# Patient Record
Sex: FEMALE | Race: WHITE | NOT HISPANIC OR LATINO | Employment: OTHER | URBAN - METROPOLITAN AREA
[De-identification: names, ages, dates, MRNs, and addresses within clinical notes are randomized per-mention and may not be internally consistent; named-entity substitution may affect disease eponyms.]

---

## 2018-04-20 ENCOUNTER — HOSPITAL ENCOUNTER (OUTPATIENT)
Dept: FAMILY MEDICINE CLINIC | Facility: CLINIC | Age: 48
Setting detail: SPECIMEN
Discharge: HOME | End: 2018-04-20
Attending: FAMILY MEDICINE | Admitting: FAMILY MEDICINE

## 2018-04-20 LAB
ALBUMIN SERPL-MCNC: 4.7 G/DL (ref 3.5–4.8)
ALBUMIN/GLOB SERPL: 2 {RATIO} (ref 1–1.7)
ALP SERPL-CCNC: 39 IU/L (ref 32–91)
ALT SERPL-CCNC: 13 IU/L (ref 14–54)
ANION GAP SERPL CALC-SCNC: 10.3 MMOL/L (ref 10–20)
AST SERPL-CCNC: 18 IU/L (ref 15–41)
BASOPHILS # BLD AUTO: 0.1 10*3/UL (ref 0–0.2)
BASOPHILS NFR BLD AUTO: 1 % (ref 0–2)
BILIRUB SERPL-MCNC: 1.5 MG/DL (ref 0.3–1.2)
BUN SERPL-MCNC: 8 MG/DL (ref 8–20)
BUN/CREAT SERPL: 11.4 (ref 5.4–26.2)
CALCIUM SERPL-MCNC: 9.5 MG/DL (ref 8.9–10.3)
CHLORIDE SERPL-SCNC: 105 MMOL/L (ref 101–111)
CHOLEST SERPL-MCNC: 229 MG/DL
CHOLEST/HDLC SERPL: 2.4 {RATIO}
CONV CO2: 25 MMOL/L (ref 22–32)
CONV LDL CHOLESTEROL DIRECT: 114 MG/DL (ref 0–100)
CONV TOTAL PROTEIN: 7 G/DL (ref 6.1–7.9)
CREAT UR-MCNC: 0.7 MG/DL (ref 0.4–1)
DIFFERENTIAL METHOD BLD: (no result)
EOSINOPHIL # BLD AUTO: 0.1 10*3/UL (ref 0–0.3)
EOSINOPHIL # BLD AUTO: 1 % (ref 0–3)
ERYTHROCYTE [DISTWIDTH] IN BLOOD BY AUTOMATED COUNT: 13.3 % (ref 11.5–14.5)
GLOBULIN UR ELPH-MCNC: 2.3 G/DL (ref 2.5–3.8)
GLUCOSE SERPL-MCNC: 85 MG/DL (ref 65–99)
HCT VFR BLD AUTO: 42.4 % (ref 35–49)
HDLC SERPL-MCNC: 96 MG/DL
HGB BLD-MCNC: 14.4 G/DL (ref 12–15)
LDLC/HDLC SERPL: 1.2 {RATIO}
LIPID INTERPRETATION: ABNORMAL
LYMPHOCYTES # BLD AUTO: 2.6 10*3/UL (ref 0.8–4.8)
LYMPHOCYTES NFR BLD AUTO: 31 % (ref 18–42)
MCH RBC QN AUTO: 30.7 PG (ref 26–32)
MCHC RBC AUTO-ENTMCNC: 34 G/DL (ref 32–36)
MCV RBC AUTO: 90.1 FL (ref 80–94)
MONOCYTES # BLD AUTO: 0.5 10*3/UL (ref 0.1–1.3)
MONOCYTES NFR BLD AUTO: 6 % (ref 2–11)
NEUTROPHILS # BLD AUTO: 5.2 10*3/UL (ref 2.3–8.6)
NEUTROPHILS NFR BLD AUTO: 61 % (ref 50–75)
NRBC BLD AUTO-RTO: 0 /100{WBCS}
NRBC/RBC NFR BLD MANUAL: 0 10*3/UL
PLATELET # BLD AUTO: 269 10*3/UL (ref 150–450)
PMV BLD AUTO: 9.2 FL (ref 7.4–10.4)
POTASSIUM SERPL-SCNC: 3.3 MMOL/L (ref 3.6–5.1)
RBC # BLD AUTO: 4.7 10*6/UL (ref 4–5.4)
SODIUM SERPL-SCNC: 137 MMOL/L (ref 136–144)
TRIGL SERPL-MCNC: 69 MG/DL
VLDLC SERPL CALC-MCNC: 19.2 MG/DL
WBC # BLD AUTO: 8.4 10*3/UL (ref 4.5–11.5)

## 2018-11-16 ENCOUNTER — HOSPITAL ENCOUNTER (OUTPATIENT)
Dept: FAMILY MEDICINE CLINIC | Facility: CLINIC | Age: 48
Setting detail: SPECIMEN
Discharge: HOME | End: 2018-11-16
Attending: FAMILY MEDICINE | Admitting: FAMILY MEDICINE

## 2018-11-16 LAB
ALBUMIN SERPL-MCNC: 4.3 G/DL (ref 3.5–4.8)
ALBUMIN/GLOB SERPL: 1.8 {RATIO} (ref 1–1.7)
ALP SERPL-CCNC: 45 IU/L (ref 32–91)
ALT SERPL-CCNC: 14 IU/L (ref 14–54)
AMYLASE SERPL-CCNC: 39 U/L (ref 36–128)
ANION GAP SERPL CALC-SCNC: 15.1 MMOL/L (ref 10–20)
AST SERPL-CCNC: 16 IU/L (ref 15–41)
BASOPHILS # BLD AUTO: 0.1 10*3/UL (ref 0–0.2)
BASOPHILS NFR BLD AUTO: 1 % (ref 0–2)
BILIRUB SERPL-MCNC: 1.6 MG/DL (ref 0.3–1.2)
BUN SERPL-MCNC: 9 MG/DL (ref 8–20)
BUN/CREAT SERPL: 11.3 (ref 5.4–26.2)
CALCIUM SERPL-MCNC: 10.2 MG/DL (ref 8.9–10.3)
CHLORIDE SERPL-SCNC: 102 MMOL/L (ref 101–111)
CONV CO2: 26 MMOL/L (ref 22–32)
CONV TOTAL PROTEIN: 6.7 G/DL (ref 6.1–7.9)
CREAT UR-MCNC: 0.8 MG/DL (ref 0.4–1)
DIFFERENTIAL METHOD BLD: (no result)
EOSINOPHIL # BLD AUTO: 0.1 10*3/UL (ref 0–0.3)
EOSINOPHIL # BLD AUTO: 1 % (ref 0–3)
ERYTHROCYTE [DISTWIDTH] IN BLOOD BY AUTOMATED COUNT: 13.5 % (ref 11.5–14.5)
GLOBULIN UR ELPH-MCNC: 2.4 G/DL (ref 2.5–3.8)
GLUCOSE SERPL-MCNC: 101 MG/DL (ref 65–99)
HCT VFR BLD AUTO: 42.1 % (ref 35–49)
HGB BLD-MCNC: 14.4 G/DL (ref 12–15)
LIPASE SERPL-CCNC: 26 U/L (ref 22–51)
LYMPHOCYTES # BLD AUTO: 2.7 10*3/UL (ref 0.8–4.8)
LYMPHOCYTES NFR BLD AUTO: 30 % (ref 18–42)
MCH RBC QN AUTO: 31.1 PG (ref 26–32)
MCHC RBC AUTO-ENTMCNC: 34.2 G/DL (ref 32–36)
MCV RBC AUTO: 91.1 FL (ref 80–94)
MONOCYTES # BLD AUTO: 0.6 10*3/UL (ref 0.1–1.3)
MONOCYTES NFR BLD AUTO: 7 % (ref 2–11)
NEUTROPHILS # BLD AUTO: 5.5 10*3/UL (ref 2.3–8.6)
NEUTROPHILS NFR BLD AUTO: 61 % (ref 50–75)
NRBC BLD AUTO-RTO: 0 /100{WBCS}
NRBC/RBC NFR BLD MANUAL: 0 10*3/UL
PLATELET # BLD AUTO: 286 10*3/UL (ref 150–450)
PMV BLD AUTO: 8.9 FL (ref 7.4–10.4)
POTASSIUM SERPL-SCNC: 4.1 MMOL/L (ref 3.6–5.1)
RBC # BLD AUTO: 4.62 10*6/UL (ref 4–5.4)
SODIUM SERPL-SCNC: 139 MMOL/L (ref 136–144)
WBC # BLD AUTO: 9 10*3/UL (ref 4.5–11.5)

## 2018-11-28 ENCOUNTER — HOSPITAL ENCOUNTER (OUTPATIENT)
Dept: ULTRASOUND IMAGING | Facility: HOSPITAL | Age: 48
Discharge: HOME | End: 2018-11-28
Attending: FAMILY MEDICINE | Admitting: FAMILY MEDICINE

## 2018-12-06 ENCOUNTER — HOSPITAL ENCOUNTER (OUTPATIENT)
Dept: FAMILY MEDICINE CLINIC | Facility: CLINIC | Age: 48
Setting detail: SPECIMEN
Discharge: HOME | End: 2018-12-06
Attending: FAMILY MEDICINE | Admitting: FAMILY MEDICINE

## 2018-12-07 LAB — HAV IGM SERPL QL IA: NONREACTIVE

## 2018-12-21 ENCOUNTER — OFFICE (AMBULATORY)
Dept: URBAN - METROPOLITAN AREA CLINIC 64 | Facility: CLINIC | Age: 48
End: 2018-12-21

## 2018-12-21 VITALS
HEART RATE: 92 BPM | DIASTOLIC BLOOD PRESSURE: 88 MMHG | WEIGHT: 149 LBS | HEIGHT: 68 IN | SYSTOLIC BLOOD PRESSURE: 145 MMHG

## 2018-12-21 DIAGNOSIS — Z12.11 ENCOUNTER FOR SCREENING FOR MALIGNANT NEOPLASM OF COLON: ICD-10-CM

## 2018-12-21 DIAGNOSIS — R10.11 RIGHT UPPER QUADRANT PAIN: ICD-10-CM

## 2018-12-21 DIAGNOSIS — R16.0 HEPATOMEGALY, NOT ELSEWHERE CLASSIFIED: ICD-10-CM

## 2018-12-21 PROCEDURE — 99244 OFF/OP CNSLTJ NEW/EST MOD 40: CPT | Performed by: INTERNAL MEDICINE

## 2018-12-29 ENCOUNTER — HOSPITAL ENCOUNTER (OUTPATIENT)
Dept: MRI IMAGING | Facility: HOSPITAL | Age: 48
Discharge: HOME | End: 2018-12-29
Attending: INTERNAL MEDICINE | Admitting: INTERNAL MEDICINE

## 2019-01-31 ENCOUNTER — OFFICE (AMBULATORY)
Dept: URBAN - METROPOLITAN AREA PATHOLOGY 4 | Facility: PATHOLOGY | Age: 49
End: 2019-01-31

## 2019-01-31 ENCOUNTER — ON CAMPUS - OUTPATIENT (AMBULATORY)
Dept: URBAN - METROPOLITAN AREA HOSPITAL 2 | Facility: HOSPITAL | Age: 49
End: 2019-01-31

## 2019-01-31 VITALS
OXYGEN SATURATION: 98 % | DIASTOLIC BLOOD PRESSURE: 72 MMHG | HEART RATE: 86 BPM | HEART RATE: 99 BPM | WEIGHT: 141.8 LBS | SYSTOLIC BLOOD PRESSURE: 120 MMHG | HEART RATE: 84 BPM | RESPIRATION RATE: 16 BRPM | HEIGHT: 68 IN | DIASTOLIC BLOOD PRESSURE: 78 MMHG | HEART RATE: 95 BPM | SYSTOLIC BLOOD PRESSURE: 109 MMHG | SYSTOLIC BLOOD PRESSURE: 129 MMHG | RESPIRATION RATE: 13 BRPM | OXYGEN SATURATION: 100 % | SYSTOLIC BLOOD PRESSURE: 134 MMHG | SYSTOLIC BLOOD PRESSURE: 139 MMHG | HEART RATE: 87 BPM | TEMPERATURE: 98.5 F | DIASTOLIC BLOOD PRESSURE: 87 MMHG | DIASTOLIC BLOOD PRESSURE: 94 MMHG | OXYGEN SATURATION: 97 %

## 2019-01-31 DIAGNOSIS — K31.7 POLYP OF STOMACH AND DUODENUM: ICD-10-CM

## 2019-01-31 DIAGNOSIS — K44.9 DIAPHRAGMATIC HERNIA WITHOUT OBSTRUCTION OR GANGRENE: ICD-10-CM

## 2019-01-31 DIAGNOSIS — R10.11 RIGHT UPPER QUADRANT PAIN: ICD-10-CM

## 2019-01-31 LAB
GI HISTOLOGY: A. UNSPECIFIED: (no result)
GI HISTOLOGY: PDF REPORT: (no result)

## 2019-01-31 PROCEDURE — 88305 TISSUE EXAM BY PATHOLOGIST: CPT | Performed by: INTERNAL MEDICINE

## 2019-01-31 PROCEDURE — 43239 EGD BIOPSY SINGLE/MULTIPLE: CPT | Performed by: INTERNAL MEDICINE

## 2022-03-10 PROCEDURE — 87186 SC STD MICRODIL/AGAR DIL: CPT | Performed by: FAMILY MEDICINE

## 2022-03-10 PROCEDURE — 87077 CULTURE AEROBIC IDENTIFY: CPT | Performed by: FAMILY MEDICINE

## 2022-03-10 PROCEDURE — 87086 URINE CULTURE/COLONY COUNT: CPT | Performed by: FAMILY MEDICINE

## 2024-03-11 ENCOUNTER — OFFICE VISIT (OUTPATIENT)
Dept: FAMILY MEDICINE CLINIC | Facility: CLINIC | Age: 54
End: 2024-03-11
Payer: COMMERCIAL

## 2024-03-11 VITALS
HEIGHT: 66 IN | OXYGEN SATURATION: 99 % | RESPIRATION RATE: 18 BRPM | TEMPERATURE: 98.7 F | HEART RATE: 81 BPM | BODY MASS INDEX: 24.85 KG/M2 | WEIGHT: 154.6 LBS | SYSTOLIC BLOOD PRESSURE: 127 MMHG | DIASTOLIC BLOOD PRESSURE: 87 MMHG

## 2024-03-11 DIAGNOSIS — R05.1 ACUTE COUGH: ICD-10-CM

## 2024-03-11 DIAGNOSIS — S46.812A STRAIN OF LEFT TRAPEZIUS MUSCLE, INITIAL ENCOUNTER: ICD-10-CM

## 2024-03-11 DIAGNOSIS — J01.20 ACUTE NON-RECURRENT ETHMOIDAL SINUSITIS: Primary | ICD-10-CM

## 2024-03-11 RX ORDER — BENZONATATE 100 MG/1
100 CAPSULE ORAL 3 TIMES DAILY PRN
Qty: 30 CAPSULE | Refills: 0 | Status: SHIPPED | OUTPATIENT
Start: 2024-03-11

## 2024-03-11 RX ORDER — PREDNISONE 20 MG/1
20 TABLET ORAL 2 TIMES DAILY
Qty: 10 TABLET | Refills: 0 | Status: SHIPPED | OUTPATIENT
Start: 2024-03-11 | End: 2024-03-16

## 2024-03-11 RX ORDER — AZITHROMYCIN 250 MG/1
TABLET, FILM COATED ORAL
Qty: 6 TABLET | Refills: 0 | Status: SHIPPED | OUTPATIENT
Start: 2024-03-11

## 2024-03-11 NOTE — PROGRESS NOTES
Subjective   Sharon Ruth is a 53 y.o. female.     History of Present Illness  Pt presents with complaint of nasal congestion. Symptoms started about 4 weeks ago. She was seen in the urgent care on 3/1/24 and prescribed Doxycycline and a Medrol Dose Fortunato but she continues to have symptoms. Symptoms seemed to feel better then next 2 days but then symptoms worsened.  She did finish them completely.  She continues to have a lot of sinus pressure and nasal congestion.  She has been doing over the counter medications, nasal sprays. Denies any shortness of breath or fevers.  She also pulled a muscle in left shoulder and it has been aggravated by her cough.    She hasn't been to this office for over a year due to feeling healthy.  She isn't up to date on mammogram, labs, pap, colon cancer screening and doesn't want to at this time due to being healthy.         No past medical history on file.  No past surgical history on file.  No family history on file.  Social History     Socioeconomic History    Marital status:    Tobacco Use    Smoking status: Never     Passive exposure: Never    Smokeless tobacco: Never   Vaping Use    Vaping status: Never Used   Substance and Sexual Activity    Alcohol use: Yes     Alcohol/week: 1.0 standard drink of alcohol     Types: 1 Glasses of wine per week    Drug use: Never         Current Outpatient Medications:     azithromycin (Zithromax Z-Fortunato) 250 MG tablet, Take 2 tablets by mouth on day 1, then 1 tablet daily on days 2-5, Disp: 6 tablet, Rfl: 0    benzonatate (Tessalon Perles) 100 MG capsule, Take 1 capsule by mouth 3 (Three) Times a Day As Needed for Cough., Disp: 30 capsule, Rfl: 0    predniSONE (DELTASONE) 20 MG tablet, Take 1 tablet by mouth 2 (Two) Times a Day for 5 days., Disp: 10 tablet, Rfl: 0    Review of Systems   Constitutional:  Positive for fatigue. Negative for activity change, appetite change, chills, diaphoresis, fever, unexpected weight gain and unexpected  "weight loss.   HENT:  Positive for congestion and sinus pressure. Negative for trouble swallowing.    Eyes:  Negative for blurred vision and visual disturbance.   Respiratory:  Positive for cough. Negative for chest tightness, shortness of breath and wheezing.    Cardiovascular:  Negative for chest pain, palpitations and leg swelling.   Gastrointestinal:  Negative for abdominal pain, diarrhea, nausea and vomiting.   Musculoskeletal:  Negative for gait problem.   Neurological:  Negative for dizziness, tremors, syncope, weakness, light-headedness, headache and confusion.   Psychiatric/Behavioral:  Negative for sleep disturbance, depressed mood and stress. The patient is not nervous/anxious.      /87 (BP Location: Left arm, Patient Position: Sitting, Cuff Size: Adult)   Pulse 81   Temp 98.7 °F (37.1 °C) (Temporal)   Resp 18   Ht 168.9 cm (66.5\")   Wt 70.1 kg (154 lb 9.6 oz)   SpO2 99%   BMI 24.58 kg/m²       Objective   Physical Exam  Vitals and nursing note reviewed.   Constitutional:       Appearance: Normal appearance. She is normal weight.   HENT:      Head: Normocephalic and atraumatic.      Right Ear: Tympanic membrane, ear canal and external ear normal.      Left Ear: Tympanic membrane, ear canal and external ear normal.      Nose: Nose normal.      Right Sinus: Maxillary sinus tenderness present.      Left Sinus: Maxillary sinus tenderness present.      Mouth/Throat:      Mouth: Mucous membranes are moist.      Pharynx: Oropharynx is clear.   Eyes:      Pupils: Pupils are equal, round, and reactive to light.   Cardiovascular:      Rate and Rhythm: Normal rate and regular rhythm.      Heart sounds: Normal heart sounds.   Pulmonary:      Effort: Pulmonary effort is normal.      Breath sounds: Normal breath sounds.   Musculoskeletal:         General: Tenderness present. Normal range of motion.      Left shoulder: Tenderness present.        Arms:       Cervical back: Normal range of motion and neck " supple. No rigidity.   Lymphadenopathy:      Cervical: No cervical adenopathy.   Skin:     General: Skin is warm and dry.   Neurological:      General: No focal deficit present.      Mental Status: She is alert and oriented to person, place, and time.   Psychiatric:         Mood and Affect: Mood normal.         Behavior: Behavior normal.         Thought Content: Thought content normal.         Procedures     Assessment    Diagnoses and all orders for this visit:    1. Acute non-recurrent ethmoidal sinusitis (Primary)  Comments:  Will treat with azithromycin and prednisone.  Let me know if not improving.  This is second round of antibiotics so if symptoms continue, will get CT sinus.  Orders:  -     azithromycin (Zithromax Z-Fortunato) 250 MG tablet; Take 2 tablets by mouth on day 1, then 1 tablet daily on days 2-5  Dispense: 6 tablet; Refill: 0  -     predniSONE (DELTASONE) 20 MG tablet; Take 1 tablet by mouth 2 (Two) Times a Day for 5 days.  Dispense: 10 tablet; Refill: 0    2. Strain of left trapezius muscle, initial encounter  Comments:  Work on stretches and see PT.  Orders:  -     Ambulatory Referral to Physical Therapy Evaluate and treat    3. Acute cough  Comments:  Slight cough from drainage.  Try benzonatate as needed. Let me know if not resolving.  Orders:  -     benzonatate (Tessalon Perles) 100 MG capsule; Take 1 capsule by mouth 3 (Three) Times a Day As Needed for Cough.  Dispense: 30 capsule; Refill: 0         Make appointment for routine physical and needs to get up to date on preventative care.

## 2024-03-11 NOTE — PATIENT INSTRUCTIONS
Please make follow up appointment for routine physical to get up to date on mammogram, labs and make sure screenings are up to date.

## 2024-03-18 ENCOUNTER — TREATMENT (OUTPATIENT)
Dept: PHYSICAL THERAPY | Facility: CLINIC | Age: 54
End: 2024-03-18
Payer: COMMERCIAL

## 2024-03-18 DIAGNOSIS — S46.812A STRAIN OF LEFT TRAPEZIUS MUSCLE, INITIAL ENCOUNTER: Primary | ICD-10-CM

## 2024-03-18 NOTE — PROGRESS NOTES
Physical Therapy Initial Evaluation and Plan of Care      Patient: Sharon Ruth   : 1970  Diagnosis/ICD-10 Code:  Strain of left trapezius muscle, initial encounter [S46.812A]  Referring practitioner: PRITESH Carter  Date of Initial Visit: 3/18/2024  Today's Date: 3/18/2024  Patient seen for 1 sessions           Subjective Questionnaire: QuickDASH: 41%      Subjective Evaluation    History of Present Illness  Mechanism of injury: Patient presents to physical therapy with cc of pain in left scapula that has been present for the past 5-6 weeks.  Reports that she was scooting herself in bed when she first noticed pain, however after a couple weeks it got better.  However over the past few weeks it has gotten worse again.  Reports that pain is fairly constant and limits her ability to get comfortable to sleep.  Reports mild pain with everyday activities.  Wishes to have less left shoulder/scapular pain with ADL's and work activities.     Pain  Current pain ratin  At worst pain ratin  Location: left scapula  Quality: dull ache, sharp and knife-like  Relieving factors: ice  Aggravating factors: overhead activity, lifting, sleeping, prolonged positioning and outstretched reach  Progression: no change    Hand dominance: right    Treatments  Previous treatment: chiropractic  Patient Goals  Patient goals for therapy: decreased pain, increased strength and increased motion           Objective          Palpation   Left   Hypertonic in the middle trapezius, rhomboids, teres minor and upper trapezius.   Tenderness of the middle trapezius, rhomboids, teres minor and upper trapezius.     Tenderness     Additional Tenderness Details  TTP with reproduction of left scapular pain with PA glide to T2-T4    Active Range of Motion   Cervical/Thoracic Spine   Cervical    Left lateral flexion: 20 degrees   Right lateral flexion: 25 degrees   Left rotation: 51 degrees   Right rotation: 68 degrees     Strength/Myotome  Testing   Cervical Spine     Right   Normal strength    Left Shoulder     Planes of Motion   Flexion: 4   Abduction: 4   External rotation at 0°: 4+   Internal rotation at 0°: 4+     Isolated Muscles   Biceps: 5   Lower trapezius: 4   Middle trapezius: 4   Rhomboids: 4   Triceps: 5     Tests     Additional Tests Details  Quadrant test (+) left           Assessment & Plan       Assessment  Impairments: abnormal or restricted ROM, impaired physical strength, lacks appropriate home exercise program and pain with function   Functional limitations: carrying objects, lifting, pulling, pushing, uncomfortable because of pain, moving in bed and reaching overhead   Assessment details: Patient presents to physical therapy with s/s of thoracic hypomobility with left scapular pain.  Patient demonstrates hypomobility in upper thoracic spine, hypomobility in cervical spine, as weakness/pain with resisted testing of LUE.  Patient is appropriate for PT intervention in order to address these deficits so that she may tolerate work and ADL activities with less pain/limitation.  Prognosis: good    Goals  Plan Goals: In two weeks, patient will report at least 25% reduction in pain level in left UE and thoracic spine.   In two weeks, patient will demonstrate compliance with HEP.     In four weeks, patient will demonstrate at least 4+/5 muscular strength in LUE with resisted testing in order to demonstrate improved ability to lift and carry objects weighing at least 10 lbs with work activities.   In four weeks, patient will demonstrate equal bilateral cervical sidebending and rotation in order to demonstrate decreased limitation with looking over her shoulder when driving.   In four weeks, patient will demonstrate decreased perceived disability by decreasing score on QuickDASH by at least 15%.     Plan  Therapy options: will be seen for skilled therapy services  Planned modality interventions: cryotherapy, dry needling, TENS, thermotherapy  (hydrocollator packs) and traction  Planned therapy interventions: manual therapy, neuromuscular re-education, soft tissue mobilization, spinal/joint mobilization, strengthening, stretching, therapeutic activities, joint mobilization, home exercise program and functional ROM exercises  Frequency: 2x week  Duration in weeks: 6  Treatment plan discussed with: patient        Manual Therapy:    10     mins  46692;  Therapeutic Exercise:    15     mins  91506;     Neuromuscular Laureen:        mins  89018;    Therapeutic Activity:          mins  90613;     Gait Training:           mins  71114;     Ultrasound:          mins  35748;    Electrical Stimulation:         mins  28603 ( );  Dry Needling          mins self-pay    Timed Treatment:   25   mins   Total Treatment:     55   mins    PT SIGNATURE: Abdiaziz Huang, NICHELLE   DATE TREATMENT INITIATED: 3/18/2024    Initial Certification  Certification Period: 6/16/2024  I certify that the therapy services are furnished while this patient is under my care.  The services outlined above are required by this patient, and will be reviewed every 90 days.     PHYSICIAN: Dayanna Morrow PA      DATE:     Please sign and return via fax to [phone].. Thank you, [facility].

## 2024-03-19 ENCOUNTER — TELEPHONE (OUTPATIENT)
Dept: FAMILY MEDICINE CLINIC | Facility: CLINIC | Age: 54
End: 2024-03-19
Payer: COMMERCIAL

## 2024-03-19 DIAGNOSIS — S46.812A STRAIN OF LEFT TRAPEZIUS MUSCLE, INITIAL ENCOUNTER: Primary | ICD-10-CM

## 2024-03-19 RX ORDER — TIZANIDINE 4 MG/1
4 TABLET ORAL EVERY 8 HOURS PRN
Qty: 30 TABLET | Refills: 0 | Status: SHIPPED | OUTPATIENT
Start: 2024-03-19

## 2024-03-19 NOTE — TELEPHONE ENCOUNTER
PATIENT CALLED BACK AND STATES SHE RECEIVED A CALL STATING SHE NEEDED TO BE SEEN FOR A PRESCRIPTION FOR MUSCLE RELAXER'S FOR SHOULDER PAIN. SHE WAS SEEN BY KYLE MEDRANO ON 3/11/24 AND WAS GIVEN THE REFERRAL TO PHYSICAL THERAPY. THE PHYSICAL THERAPIST SUGGEST A MUSCLE RELAXER TO HELP WITH PROGRESS.     SHE IS WANTING TO SEE IF THE MUSCLE RELAXER CAN BE CALLED IN.     [id number] - [place], [place] - [address] - [phone]  - [phone]  [phone]     CALL BACK NUMBER [phone]

## 2024-03-20 ENCOUNTER — TREATMENT (OUTPATIENT)
Dept: PHYSICAL THERAPY | Facility: CLINIC | Age: 54
End: 2024-03-20
Payer: COMMERCIAL

## 2024-03-20 DIAGNOSIS — S46.812A STRAIN OF LEFT TRAPEZIUS MUSCLE, INITIAL ENCOUNTER: Primary | ICD-10-CM

## 2024-03-20 NOTE — PROGRESS NOTES
Physical Therapy Daily Progress Note    Patient: Sharon Ruth   : 1970  Diagnosis/ICD-10 Code:  Strain of left trapezius muscle, initial encounter [S46.812A]  Referring practitioner: PRITESH Carter  Date of Initial Visit: Type: THERAPY  Noted: 3/18/2024  Today's Date: 3/20/2024  Patient seen for 2 sessions         Sharon Ruth reports: Soreness in cervical/thoracic ceasar, as well as pain in left shoulder continues to be present.  Reports compliance with HEP.    Objective   See Exercise, Manual, and Modality Logs for complete treatment.       Assessment/Plan    Reported pain with right sidebending/left rotation MET to cervical spine.  Patient also reports soreness with STM to dorsal scapular musculature on left side.  Feeling better after modalities at end of visit.    Progress per Plan of Care           Manual Therapy:    25     mins  90913;  Therapeutic Exercise:    13     mins  91470;     Neuromuscular Laureen:        mins  96327;    Therapeutic Activity:          mins  10642;     Gait Training:           mins  61001;     Ultrasound:          mins  05156;    Electrical Stimulation:    15     mins  41210 ( );  Dry Needling          mins self-pay    Timed Treatment:   38   mins   Total Treatment:     53   mins    Abdiaziz Huang PT  Physical Therapist

## 2024-03-25 ENCOUNTER — TREATMENT (OUTPATIENT)
Dept: PHYSICAL THERAPY | Facility: CLINIC | Age: 54
End: 2024-03-25
Payer: COMMERCIAL

## 2024-03-25 DIAGNOSIS — S46.812A STRAIN OF LEFT TRAPEZIUS MUSCLE, INITIAL ENCOUNTER: Primary | ICD-10-CM

## 2024-03-25 NOTE — PROGRESS NOTES
Physical Therapy Daily Progress Note    Patient: Sharon Ruth   : 1970  Diagnosis/ICD-10 Code:  Strain of left trapezius muscle, initial encounter [S46.812A]  Referring practitioner: PRITESH Carter  Date of Initial Visit: Type: THERAPY  Noted: 3/18/2024  Today's Date: 3/25/2024  Patient seen for 3 sessions         Sharon Ruth reports:  Left shoulder/cervical/thoracic spine was feeling better after last visit, however had to help a friend hang shelves and had increased pain after this activity that is still present today.    Objective   See Exercise, Manual, and Modality Logs for complete treatment.       Assessment/Plan    Tolerates manual therapy well this visit.  Reports pain with extension of cervical spine.  Noted increased tonicity of left upper trapezius, middle trapezius and rhomboids.      Progress per Plan of Care           Manual Therapy:    25     mins  48300;  Therapeutic Exercise:    3     mins  78068;     Neuromuscular Laureen:        mins  ;    Therapeutic Activity:          mins  09996;     Gait Training:           mins  22201;     Ultrasound:          mins  16763;    Electrical Stimulation:    15     mins  19104 ( );  Dry Needling          mins self-pay      Timed Treatment:   28   mins   Total Treatment:     43   mins    Abdiaziz Huang PT  Physical Therapist

## 2024-03-29 ENCOUNTER — TREATMENT (OUTPATIENT)
Dept: PHYSICAL THERAPY | Facility: CLINIC | Age: 54
End: 2024-03-29
Payer: COMMERCIAL

## 2024-03-29 DIAGNOSIS — S46.812A STRAIN OF LEFT TRAPEZIUS MUSCLE, INITIAL ENCOUNTER: Primary | ICD-10-CM

## 2024-03-29 NOTE — PROGRESS NOTES
Physical Therapy Daily Progress Note    Patient: Sharon Ruth   : 1970  Diagnosis/ICD-10 Code:  Strain of left trapezius muscle, initial encounter [S46.812A]  Referring practitioner: PRITESH Carter  Date of Initial Visit: Type: THERAPY  Noted: 3/18/2024  Today's Date: 3/29/2024  Patient seen for 4 sessions         Sharon Ruth reports:  Soreness in cervical spine, left superior and inferior shoulder this week.  Reports that she was out of town and had long car rides and this aggravated symptoms.  Getting relief from treatment, but work/ADL activities increasing pain.     Objective   See Exercise, Manual, and Modality Logs for complete treatment.       Assessment/Plan    Tolerates manual therapy well this visit.  Tenderness with STM to dorsal scapular musculature.  Hypomobility still present in upper thoracic and cervical spine.     Progress per Plan of Care           Manual Therapy:    25     mins  64921;  Therapeutic Exercise:    5     mins  17935;     Neuromuscular Laureen:        mins  70154;    Therapeutic Activity:          mins  55306;     Gait Training:           mins  47167;     Ultrasound:          mins  68035;    Electrical Stimulation:   15      mins  89503 ( );  Dry Needling          mins self-pay    Timed Treatment:   30   mins   Total Treatment:     45   mins    Abdiaziz Huang PT  Physical Therapist

## 2024-04-01 ENCOUNTER — TREATMENT (OUTPATIENT)
Dept: PHYSICAL THERAPY | Facility: CLINIC | Age: 54
End: 2024-04-01
Payer: COMMERCIAL

## 2024-04-01 DIAGNOSIS — S46.812A STRAIN OF LEFT TRAPEZIUS MUSCLE, INITIAL ENCOUNTER: Primary | ICD-10-CM

## 2024-04-01 NOTE — PROGRESS NOTES
Physical Therapy Daily Progress Note    Patient: Sharon Ruth   : 1970  Diagnosis/ICD-10 Code:  Strain of left trapezius muscle, initial encounter [S46.812A]  Referring practitioner: PRITESH Carter  Date of Initial Visit: Type: THERAPY  Noted: 3/18/2024  Today's Date: 2024  Patient seen for 5 sessions         Sharon Ruth reports:  Increased soreness in LUE over the weekend.  Reports weakness and pain in arm with outstretched reach activities such as getting clothes out of washing machine.     Objective   See Exercise, Manual, and Modality Logs for complete treatment.       Assessment/Plan    Soreness noted with upglide of left at C6-C7 that reproduces pain in LUE.  Trial of dry needling this visit, however patient has vasovagal response after one needle, therefore treatment discontinued.  Patient feeling up to trying mechanical traction at end of visit and feels some relief after treatment.      Progress per Plan of Care           Manual Therapy:    15     mins  31171;  Therapeutic Exercise:    8     mins  99456;     Neuromuscular Laureen:        mins  11779;    Therapeutic Activity:          mins  94205;     Gait Training:           mins  63641;     Ultrasound:          mins  61852;    Electrical Stimulation:         mins  62832 ( );  Dry Needling    5      mins self-pay  Traction  15  mins  37481    Timed Treatment:   43   mins   Total Treatment:     43   mins    Abdiaziz Huang PT  Physical Therapist

## 2024-04-04 ENCOUNTER — TREATMENT (OUTPATIENT)
Dept: PHYSICAL THERAPY | Facility: CLINIC | Age: 54
End: 2024-04-04
Payer: COMMERCIAL

## 2024-04-04 DIAGNOSIS — S46.812A STRAIN OF LEFT TRAPEZIUS MUSCLE, INITIAL ENCOUNTER: Primary | ICD-10-CM

## 2024-04-04 NOTE — PROGRESS NOTES
Physical Therapy Daily Progress Note      Patient: Sharon Ruth   : 1970  Diagnosis/ICD-10 Code:  Strain of left trapezius muscle, initial encounter [S46.812A]  Referring practitioner: PRITESH Carter  Date of Initial Visit: Type: THERAPY  Noted: 3/18/2024  Today's Date: 2024  Patient seen for 6 sessions         Sharon Ruth reports: Felt better after last visit, as she had less pain in left arm.  However pain has since returned after picking up her dogs two days ago when getting them inside away from the [facility].     Objective   See Exercise, Manual, and Modality Logs for complete treatment.       Assessment/Plan    Tolerates modalities and manual therapy well this visit.  Reports decreased left arm pain after mechanical traction.  Hypertonicity of left upper trapezius noted this visit, as well as tenderness at C6-C7 facet on left.     Progress per Plan of Care           Manual Therapy:    15     mins  69806;  Therapeutic Exercise:         mins  93266;     Neuromuscular Laureen:        mins  42653;    Therapeutic Activity:          mins  33084;     Gait Training:           mins  32220;     Ultrasound:          mins  92570;    Electrical Stimulation:    15     mins  77484 ( );  Dry Needling          mins self-pay  Traction     15  mins  96085    Timed Treatment:   15   mins   Total Treatment:     45   mins    Abdiaziz Huang PT  Physical Therapist

## 2024-04-09 ENCOUNTER — TREATMENT (OUTPATIENT)
Dept: PHYSICAL THERAPY | Facility: CLINIC | Age: 54
End: 2024-04-09
Payer: COMMERCIAL

## 2024-04-09 DIAGNOSIS — S46.812A STRAIN OF LEFT TRAPEZIUS MUSCLE, INITIAL ENCOUNTER: Primary | ICD-10-CM

## 2024-04-09 NOTE — PROGRESS NOTES
Physical Therapy Daily Progress Note    Patient: Sharon Ruth   : 1970  Diagnosis/ICD-10 Code:  Strain of left trapezius muscle, initial encounter [S46.812A]  Referring practitioner: PRITESH Carter  Date of Initial Visit: Type: THERAPY  Noted: 3/18/2024  Today's Date: 2024  Patient seen for 7 sessions         Sharon Ruth reports:  Soreness in left shoulder, cervical/thoracic spine still present.  Reports having to move furniture and things around the house due to having work done and is sore from that.    Objective   See Exercise, Manual, and Modality Logs for complete treatment.       Assessment/Plan    Soreness and left shoulder pain reproduction with UPA to C6 on left.  Tolerates manual therapy and modalities well this visit.  Decreased pain in left UE after mechanical traction.     Progress per Plan of Care           Manual Therapy:    15     mins  14722;  Therapeutic Exercise:    10     mins  83342;     Neuromuscular Laureen:        mins  06938;    Therapeutic Activity:          mins  41897;     Gait Training:           mins  73310;     Ultrasound:          mins  89797;    Electrical Stimulation:    15     mins  40656 ( );  Dry Needling          mins self-pay  Traction    15  mins  00488    Timed Treatment:   25   mins   Total Treatment:     55   mins    Abdiaziz Huang PT  Physical Therapist

## 2024-04-15 ENCOUNTER — TELEPHONE (OUTPATIENT)
Dept: PHYSICAL THERAPY | Facility: CLINIC | Age: 54
End: 2024-04-15

## 2024-04-18 ENCOUNTER — TREATMENT (OUTPATIENT)
Dept: PHYSICAL THERAPY | Facility: CLINIC | Age: 54
End: 2024-04-18
Payer: COMMERCIAL

## 2024-04-18 DIAGNOSIS — S46.812A STRAIN OF LEFT TRAPEZIUS MUSCLE, INITIAL ENCOUNTER: Primary | ICD-10-CM

## 2024-04-18 NOTE — PROGRESS NOTES
Physical Therapy Daily Progress Note      Patient: Sharon Ruth   : 1970  Diagnosis/ICD-10 Code:  Strain of left trapezius muscle, initial encounter [S46.812A]  Referring practitioner: PRITESH Carter  Date of Initial Visit: Type: THERAPY  Noted: 3/18/2024  Today's Date: 2024  Patient seen for 8 sessions         Sharon Ruth reports:  Feeling better over the past week.  Still having some pain in left elbow and pain in left shoulder/scapula, but overall pain is less.     Objective   See Exercise, Manual, and Modality Logs for complete treatment.     Pippa's (+)    Assessment/Plan    Noted positive test for lateral epicondylitis in left elbow this visit.  Patient tolerates manual therapy to cervical/thoracic spine, as well as left elbow well this visit.  Patient feeling well after modalities.  Noted continued limitation with right sidebending/left rotation in cervical spine.     Progress per Plan of Care           Manual Therapy:    25     mins  29135;  Therapeutic Exercise:    10     mins  78508;     Neuromuscular Laureen:    10    mins  55672;    Therapeutic Activity:          mins  37412;     Gait Training:           mins  50945;     Ultrasound:          mins  77448;    Electrical Stimulation:    15     mins  29080 ( );  Dry Needling          mins self-pay    Timed Treatment:   45   mins   Total Treatment:     60   mins    Abdiaziz Huang PT  Physical Therapist

## 2024-04-22 ENCOUNTER — TREATMENT (OUTPATIENT)
Dept: PHYSICAL THERAPY | Facility: CLINIC | Age: 54
End: 2024-04-22
Payer: COMMERCIAL

## 2024-04-22 DIAGNOSIS — S46.812A STRAIN OF LEFT TRAPEZIUS MUSCLE, INITIAL ENCOUNTER: Primary | ICD-10-CM

## 2024-04-22 PROCEDURE — 97110 THERAPEUTIC EXERCISES: CPT | Performed by: PHYSICAL THERAPIST

## 2024-04-22 PROCEDURE — 97112 NEUROMUSCULAR REEDUCATION: CPT | Performed by: PHYSICAL THERAPIST

## 2024-04-22 PROCEDURE — 97140 MANUAL THERAPY 1/> REGIONS: CPT | Performed by: PHYSICAL THERAPIST

## 2024-04-23 NOTE — PROGRESS NOTES
Physical Therapy Daily Progress Note    Patient: Sharon Ruth   : 1970  Diagnosis/ICD-10 Code:  Strain of left trapezius muscle, initial encounter [S46.812A]  Referring practitioner: PRITESH Carter  Date of Initial Visit: Type: THERAPY  Noted: 3/18/2024  Today's Date: 2024  Patient seen for 9 sessions         Sharon Ruth reports:  Soreness in neck/left shoulder after prolonged driving over the weekend.  Feels that symptoms come and go.  Still getting pain in left elbow as well.  Has tried tennis elbow strap, however not sure if she's wearing it correctly.    Objective   See Exercise, Manual, and Modality Logs for complete treatment.       Assessment/Plan    Tolerates manual therapy well this visit.  Soreness reported with PA glide to thoracic spine.  Educated on proper positioning of lateral epicondylitis strap.  Feeling well at end of visit.    Progress per Plan of Care           Manual Therapy:    30     mins  89273;  Therapeutic Exercise:    15     mins  67953;     Neuromuscular Laureen:    10    mins  02452;    Therapeutic Activity:          mins  32982;     Gait Training:           mins  82048;     Ultrasound:          mins  90700;    Electrical Stimulation:         mins  30239 ( );  Dry Needling          mins self-pay    Timed Treatment:   55   mins   Total Treatment:     55   mins    Abdiaziz Huang PT  Physical Therapist

## 2024-04-25 ENCOUNTER — TREATMENT (OUTPATIENT)
Dept: PHYSICAL THERAPY | Facility: CLINIC | Age: 54
End: 2024-04-25
Payer: COMMERCIAL

## 2024-04-25 DIAGNOSIS — S46.812A STRAIN OF LEFT TRAPEZIUS MUSCLE, INITIAL ENCOUNTER: Primary | ICD-10-CM

## 2024-04-25 NOTE — PROGRESS NOTES
Physical Therapy Daily Progress Note      Patient: Sharon Ruth   : 1970  Diagnosis/ICD-10 Code:  Strain of left trapezius muscle, initial encounter [S46.812A]  Referring practitioner: PRITESH Carter  Date of Initial Visit: Type: THERAPY  Noted: 3/18/2024  Today's Date: 2024  Patient seen for 10 sessions         Sharon Ruth reports:  Patient reports feeling better for two days after last visit, however after having long day of work yesterday her pain increased.  Patient reports pain in left elbow and pain starting to radiate in left shoulder again.     Objective   See Exercise, Manual, and Modality Logs for complete treatment.     Palpation   Left   Hypertonic in the middle trapezius, rhomboids, teres minor and upper trapezius.   Tenderness of the middle trapezius, rhomboids, teres minor and upper trapezius.      Tenderness      Additional Tenderness Details  TTP with reproduction of left scapular pain with PA glide to T2-T4     Active Range of Motion   Cervical/Thoracic Spine   Cervical     Left lateral flexion: 20 degrees   Right lateral flexion: 25 degrees   Left rotation: 55 degrees   Right rotation: 68 degrees          Assessment/Plan    Goals:   In two weeks, patient will report at least 25% reduction in pain level in left UE and thoracic spine. Partially met  In two weeks, patient will demonstrate compliance with HEP. met     In four weeks, patient will demonstrate at least 4+/5 muscular strength in LUE with resisted testing in order to demonstrate improved ability to lift and carry objects weighing at least 10 lbs with work activities. Met   In four weeks, patient will demonstrate equal bilateral cervical sidebending and rotation in order to demonstrate decreased limitation with looking over her shoulder when driving. Progressing  In four weeks, patient will demonstrate decreased perceived disability by decreasing score on QuickDASH by at least 15%. Progressing    Tolerates manual  therapy and exercise well this visit.  Noted multiple areas of soft tissue restriction on left side of thoracic spine and near left scapula.  Hypomobility in upper thoracic spine still present.  Patient is appropriate to continue PT intervention in order to continue progressing towards goals.         Progress per Plan of Care    2x per week for 4 weeks           Manual Therapy:    35     mins  71452;  Therapeutic Exercise:    10     mins  31915;     Neuromuscular Laureen:    10    mins  15538;    Therapeutic Activity:          mins  21189;     Gait Training:           mins  64919;     Ultrasound:          mins  65457;    Electrical Stimulation:    10     mins  49164 ( );  Dry Needling          mins self-pay    Timed Treatment:   55   mins   Total Treatment:     65   mins    Abdiaziz Huang PT  Physical Therapist

## 2024-04-25 NOTE — PROGRESS NOTES
Re-Assessment / Re-Certification        Patient: Sharon Ruth   : 1970  Diagnosis/ICD-10 Code:  Strain of left trapezius muscle, initial encounter [S46.812A]  Referring practitioner: PRITESH Carter  Date of Initial Visit: Type: THERAPY  Noted: 3/18/2024  Today's Date: 2024  Patient seen for 10 sessions      Subjective:   Sharon Ruth reports: Patient reports feeling better for two days after last visit, however after having long day of work yesterday her pain increased. Patient reports pain in left elbow and pain starting to radiate in left shoulder again.   Clinical Progress: unchanged, mild objective improvement in cervical rotation AROM  Home Program Compliance: Yes  Treatment has included: therapeutic exercise, neuromuscular re-education, manual therapy, electrical stimulation, traction, dry needling, and moist heat    Subjective Evaluation    Pain  Current pain ratin         Objective     Palpation   Left   Hypertonic in the middle trapezius, rhomboids, teres minor and upper trapezius.   Tenderness of the middle trapezius, rhomboids, teres minor and upper trapezius.      Tenderness      Additional Tenderness Details  TTP with reproduction of left scapular pain with PA glide to T2-T4     Active Range of Motion   Cervical/Thoracic Spine   Cervical     Left lateral flexion: 20 degrees   Right lateral flexion: 25 degrees   Left rotation: 55 degrees   Right rotation: 68 degrees        Assessment/Plan  Progress toward previous goals: Partially Met    Goals:   In two weeks, patient will report at least 25% reduction in pain level in left UE and thoracic spine. Partially met  In two weeks, patient will demonstrate compliance with HEP. met     In four weeks, patient will demonstrate at least 4+/5 muscular strength in LUE with resisted testing in order to demonstrate improved ability to lift and carry objects weighing at least 10 lbs with work activities. Met   In four weeks, patient will  demonstrate equal bilateral cervical sidebending and rotation in order to demonstrate decreased limitation with looking over her shoulder when driving. Progressing  In four weeks, patient will demonstrate decreased perceived disability by decreasing score on QuickDASH by at least 15%. Progressing     Tolerates manual therapy and exercise well this visit.  Noted multiple areas of soft tissue restriction on left side of thoracic spine and near left scapula.  Hypomobility in upper thoracic spine still present.  Patient is appropriate to continue PT intervention in order to continue progressing towards goals.         Recommendations: Continue as planned  Timeframe: 1 month  Prognosis to achieve goals: good    PT Signature: Abdiaziz Huang PT      Based upon review of the patient's progress and continued therapy plan, it is my medical opinion that Sharon Ruth should continue physical therapy treatment at [facility]  [address] [place] [address] [address] [place] [place] [id number] [phone].    Signature: __________________________________  Dayanna Morrow PA    Manual Therapy:    35     mins  02479;  Therapeutic Exercise:    10     mins  71750;     Neuromuscular Laureen:    10    mins  01197;    Therapeutic Activity:          mins  26911;     Gait Training:           mins  06613;     Ultrasound:          mins  12152;    Electrical Stimulation:    10     mins  40336 ( );  Dry Needling          mins self-pay    Timed Treatment:   55   mins   Total Treatment:     65   mins

## 2024-05-01 ENCOUNTER — TREATMENT (OUTPATIENT)
Dept: PHYSICAL THERAPY | Facility: CLINIC | Age: 54
End: 2024-05-01
Payer: COMMERCIAL

## 2024-05-01 DIAGNOSIS — S46.812A STRAIN OF LEFT TRAPEZIUS MUSCLE, INITIAL ENCOUNTER: Primary | ICD-10-CM

## 2024-05-01 PROCEDURE — 97014 ELECTRIC STIMULATION THERAPY: CPT | Performed by: PHYSICAL THERAPIST

## 2024-05-01 PROCEDURE — 97110 THERAPEUTIC EXERCISES: CPT | Performed by: PHYSICAL THERAPIST

## 2024-05-01 PROCEDURE — 97140 MANUAL THERAPY 1/> REGIONS: CPT | Performed by: PHYSICAL THERAPIST

## 2024-05-01 NOTE — PROGRESS NOTES
Physical Therapy Daily Progress Note    Patient: Sharon Ruth   : 1970  Diagnosis/ICD-10 Code:  Strain of left trapezius muscle, initial encounter [S46.812A]  Referring practitioner: PRITESH Carter  Date of Initial Visit: Type: THERAPY  Noted: 3/18/2024  Today's Date: 2024  Patient seen for 11 sessions         Sharon Ruth reports:  Sore in left shoulder since last visit that is starting to improve.  Feels decreased in pain in LUE, however soreness near lateral left scapula.    Objective   See Exercise, Manual, and Modality Logs for complete treatment.       Assessment/Plan    Tolerates manual therapy well this visit.  Noted improved mobility with right sidebending and left rotation of lower cervical spine and improved passive mobility with PA glide of upper thoracic spine.  Feeling well after modalities.    Progress per Plan of Care           Manual Therapy:    35     mins  11019;  Therapeutic Exercise:    10     mins  32209;     Neuromuscular Laureen:        mins  40935;    Therapeutic Activity:          mins  30186;     Gait Training:           mins  58931;     Ultrasound:          mins  60003;    Electrical Stimulation:    10     mins  51568 ( );  Dry Needling          mins self-pay    Timed Treatment:   45   mins   Total Treatment:     55   mins    Abdiaziz Huang PT  Physical Therapist

## 2024-05-03 ENCOUNTER — TREATMENT (OUTPATIENT)
Dept: PHYSICAL THERAPY | Facility: CLINIC | Age: 54
End: 2024-05-03
Payer: COMMERCIAL

## 2024-05-03 DIAGNOSIS — S46.812A STRAIN OF LEFT TRAPEZIUS MUSCLE, INITIAL ENCOUNTER: Primary | ICD-10-CM

## 2024-05-03 NOTE — PROGRESS NOTES
Physical Therapy Daily Progress Note      Patient: Sharon Ruth   : 1970  Diagnosis/ICD-10 Code:  Strain of left trapezius muscle, initial encounter [S46.812A]  Referring practitioner: PRITESH Carter  Date of Initial Visit: Type: THERAPY  Noted: 3/18/2024  Today's Date: 5/3/2024  Patient seen for 12 sessions         Sharon Ruth reports:  Left elbow and cervical spine still sore.  Decreased soreness surrounding left scapula over the past few days.      Objective   See Exercise, Manual, and Modality Logs for complete treatment.       Assessment/Plan    Tolerates manual therapy and modalities well this visit.  Noted hypomobility of left scapula with upward rotation soreness in lower trapezius muscle.  Feeling well at end of visit.    Progress per Plan of Care           Manual Therapy:    30     mins  96046;  Therapeutic Exercise:    10     mins  62234;     Neuromuscular Laureen:        mins  85998;    Therapeutic Activity:          mins  50594;     Gait Training:           mins  69680;     Ultrasound:          mins  64742;    Electrical Stimulation:    15     mins  96671 ( );  Dry Needling          mins self-pay    Timed Treatment:   40   mins   Total Treatment:     55   mins    Abdiaziz Huang PT  Physical Therapist

## 2024-05-07 ENCOUNTER — TREATMENT (OUTPATIENT)
Dept: PHYSICAL THERAPY | Facility: CLINIC | Age: 54
End: 2024-05-07
Payer: COMMERCIAL

## 2024-05-07 DIAGNOSIS — S46.812A STRAIN OF LEFT TRAPEZIUS MUSCLE, INITIAL ENCOUNTER: Primary | ICD-10-CM

## 2024-05-07 PROCEDURE — 97140 MANUAL THERAPY 1/> REGIONS: CPT | Performed by: PHYSICAL THERAPIST

## 2024-05-07 PROCEDURE — 97110 THERAPEUTIC EXERCISES: CPT | Performed by: PHYSICAL THERAPIST

## 2024-05-09 ENCOUNTER — TREATMENT (OUTPATIENT)
Dept: PHYSICAL THERAPY | Facility: CLINIC | Age: 54
End: 2024-05-09
Payer: COMMERCIAL

## 2024-05-09 DIAGNOSIS — S46.812A STRAIN OF LEFT TRAPEZIUS MUSCLE, INITIAL ENCOUNTER: Primary | ICD-10-CM

## 2024-05-13 ENCOUNTER — TREATMENT (OUTPATIENT)
Dept: PHYSICAL THERAPY | Facility: CLINIC | Age: 54
End: 2024-05-13
Payer: COMMERCIAL

## 2024-05-13 DIAGNOSIS — S46.812A STRAIN OF LEFT TRAPEZIUS MUSCLE, INITIAL ENCOUNTER: Primary | ICD-10-CM

## 2024-05-13 PROCEDURE — 97110 THERAPEUTIC EXERCISES: CPT | Performed by: PHYSICAL THERAPIST

## 2024-05-13 PROCEDURE — 97140 MANUAL THERAPY 1/> REGIONS: CPT | Performed by: PHYSICAL THERAPIST

## 2024-05-13 NOTE — PROGRESS NOTES
Physical Therapy Daily Progress Note      Patient: Sharon Ruth   : 1970  Diagnosis/ICD-10 Code:  Strain of left trapezius muscle, initial encounter [S46.812A]  Referring practitioner: PRITESH Carter  Date of Initial Visit: Type: THERAPY  Noted: 3/18/2024  Today's Date: 2024  Patient seen for 15 sessions         Sharon Ruth reports:  Feeling better.  Decreased pain in left shoulder.  Still having some pain in lateral left elbow, but feels this is improving as well.    Objective   See Exercise, Manual, and Modality Logs for complete treatment.       Assessment/Plan    Tolerates manual therapy and exercise well this visit.  Improved cervical AROM with right sidebending and left rotation of cervical spine.  Mild soreness reported in left scapula during STM.  Progressing well.     Progress per Plan of Care           Manual Therapy:    30     mins  98425;  Therapeutic Exercise:    10     mins  33508;     Neuromuscular Laureen:        mins  66844;    Therapeutic Activity:          mins  69126;     Gait Training:           mins  35721;     Ultrasound:          mins  84393;    Electrical Stimulation:         mins  39990 ( );  Dry Needling          mins self-pay    Timed Treatment:   40   mins   Total Treatment:     40   mins    Abdiaziz Huang PT  Physical Therapist

## 2024-05-16 ENCOUNTER — TREATMENT (OUTPATIENT)
Dept: PHYSICAL THERAPY | Facility: CLINIC | Age: 54
End: 2024-05-16
Payer: COMMERCIAL

## 2024-05-16 DIAGNOSIS — S46.812A STRAIN OF LEFT TRAPEZIUS MUSCLE, INITIAL ENCOUNTER: Primary | ICD-10-CM

## 2024-05-17 NOTE — PROGRESS NOTES
Physical Therapy Daily Progress Note    Patient: Sharon Ruth   : 1970  Diagnosis/ICD-10 Code:  Strain of left trapezius muscle, initial encounter [S46.812A]  Referring practitioner: PRITESH Carter  Date of Initial Visit: Type: THERAPY  Noted: 3/18/2024  Today's Date: 2024  Patient seen for 16 sessions         Sharon Ruth reports:  Mild pain in left shoulder and thoracic spine.  Feels that she is tolerating more activity with less pain.    Objective   See Exercise, Manual, and Modality Logs for complete treatment.       Assessment/Plan    Feeling well after manual therapy.  Improved mobility in cervical spine noted this visit.  Progressing well.     Progress per Plan of Care           Manual Therapy:    30     mins  65311;  Therapeutic Exercise:    10     mins  38336;     Neuromuscular Laureen:        mins  10040;    Therapeutic Activity:          mins  59487;     Gait Training:           mins  56974;     Ultrasound:          mins  68661;    Electrical Stimulation:         mins  96970 ( );  Dry Needling          mins self-pay    Timed Treatment:   40   mins   Total Treatment:     40   mins    Abdiaziz Huang PT  Physical Therapist

## 2024-05-20 ENCOUNTER — TREATMENT (OUTPATIENT)
Dept: PHYSICAL THERAPY | Facility: CLINIC | Age: 54
End: 2024-05-20
Payer: COMMERCIAL

## 2024-05-20 DIAGNOSIS — S46.812A STRAIN OF LEFT TRAPEZIUS MUSCLE, INITIAL ENCOUNTER: Primary | ICD-10-CM

## 2024-05-20 PROCEDURE — 97140 MANUAL THERAPY 1/> REGIONS: CPT | Performed by: PHYSICAL THERAPIST

## 2024-05-20 PROCEDURE — 97110 THERAPEUTIC EXERCISES: CPT | Performed by: PHYSICAL THERAPIST

## 2024-05-20 NOTE — PROGRESS NOTES
Physical Therapy Daily Progress Note    Patient: Sharon Ruth   : 1970  Diagnosis/ICD-10 Code:  Strain of left trapezius muscle, initial encounter [S46.812A]  Referring practitioner: PRITESH Carter  Date of Initial Visit: Type: THERAPY  Noted: 3/18/2024  Today's Date: 2024  Patient seen for 17 sessions         Sharon Ruth reports:  Left elbow feeling much better.  Still having some mild pain in left scapula.  Overall feeling much better.    Objective          Active Range of Motion   Cervical/Thoracic Spine   Cervical    Left lateral flexion: 25 degrees   Right lateral flexion: 20 degrees   Left rotation: 52 degrees   Right rotation: 70 degrees     Additional Active Range of Motion Details  Left cervical rotation after manual tx: 58 degrees  Right cervical sidebending after manual tx: 30 degrees      See Exercise, Manual, and Modality Logs for complete treatment.       Assessment/Plan    Feeling well after manual therapy.  Noted soft tissue restriction in left scapula.  Demonstrates proper technique with newly added home exercises.  Will decrease frequency to 1x/week and transition to I HEP when appropriate.     Progress per Plan of Care           Manual Therapy:    30     mins  85981;  Therapeutic Exercise:    8     mins  15114;     Neuromuscular Laureen:        mins  80819;    Therapeutic Activity:          mins  91373;     Gait Training:           mins  12557;     Ultrasound:         mins  34218;    Electrical Stimulation:         mins  12420 ( );  Dry Needling          mins self-pay    Timed Treatment:   38   mins   Total Treatment:     38   mins    Abdiaziz Huang PT  Physical Therapist

## 2024-05-30 ENCOUNTER — TREATMENT (OUTPATIENT)
Dept: PHYSICAL THERAPY | Facility: CLINIC | Age: 54
End: 2024-05-30
Payer: COMMERCIAL

## 2024-05-30 DIAGNOSIS — S46.812A STRAIN OF LEFT TRAPEZIUS MUSCLE, INITIAL ENCOUNTER: Primary | ICD-10-CM

## 2024-05-30 NOTE — PROGRESS NOTES
"   Physical Therapy Daily Progress Note    Patient: Sharon Ruth   : 1970  Diagnosis/ICD-10 Code:  Strain of left trapezius muscle, initial encounter [S46.812A]  Referring practitioner: PRITESH Carter  Date of Initial Visit: Type: THERAPY  Noted: 3/18/2024  Today's Date: 2024  Patient seen for 18 sessions         Sharon Ruth reports:  Feeling much better.  Mild pain on top of left shoulder but feels that she's \"97% better\".      Objective          Active Range of Motion   Cervical/Thoracic Spine   Cervical    Left lateral flexion: 22 degrees   Right lateral flexion: 30 degrees   Left rotation: 58 degrees   Right rotation: 70 degrees       See Exercise, Manual, and Modality Logs for complete treatment.       Assessment/Plan    Feeling well after manual therapy.  Patient to continue with HEP and follow up for PT tx as needed.     Progress per Plan of Care           Manual Therapy:    30     mins  23294;  Therapeutic Exercise:    5     mins  53892;     Neuromuscular Laureen:        mins  77512;    Therapeutic Activity:          mins  47451;     Gait Training:           mins  53278;     Ultrasound:          mins  77662;    Electrical Stimulation:         mins  06226 ( );  Dry Needling          mins self-pay    Timed Treatment:   35   mins   Total Treatment:     35   mins    Abdiaziz Huang PT  Physical Therapist                      "

## 2024-09-24 ENCOUNTER — LAB REQUISITION (OUTPATIENT)
Dept: LAB | Facility: HOSPITAL | Age: 54
End: 2024-09-24
Payer: COMMERCIAL

## 2024-09-24 DIAGNOSIS — N63.10 UNSPECIFIED LUMP IN THE RIGHT BREAST, UNSPECIFIED QUADRANT: ICD-10-CM

## 2024-09-24 PROCEDURE — 88305 TISSUE EXAM BY PATHOLOGIST: CPT

## 2024-10-03 LAB — GLOBAL PROGNOSTIC (BREAST) RESULT: NORMAL

## 2024-10-04 LAB
LAB AP CASE REPORT: NORMAL
LAB AP DIAGNOSIS COMMENT: NORMAL
LAB AP IHC HER2/NEU REPORT,ADDENDUM: NORMAL
PATH REPORT.ADDENDUM SPEC: NORMAL
PATH REPORT.FINAL DX SPEC: NORMAL
PATH REPORT.GROSS SPEC: NORMAL

## 2024-10-14 ENCOUNTER — TELEPHONE (OUTPATIENT)
Dept: SURGERY | Facility: CLINIC | Age: 54
End: 2024-10-14
Payer: COMMERCIAL

## 2024-10-14 DIAGNOSIS — Z17.0 MALIGNANT NEOPLASM OF UPPER-OUTER QUADRANT OF RIGHT BREAST IN FEMALE, ESTROGEN RECEPTOR POSITIVE: Primary | ICD-10-CM

## 2024-10-14 DIAGNOSIS — C50.411 MALIGNANT NEOPLASM OF UPPER-OUTER QUADRANT OF RIGHT FEMALE BREAST, UNSPECIFIED ESTROGEN RECEPTOR STATUS: ICD-10-CM

## 2024-10-14 DIAGNOSIS — C50.411 MALIGNANT NEOPLASM OF UPPER-OUTER QUADRANT OF RIGHT BREAST IN FEMALE, ESTROGEN RECEPTOR POSITIVE: Primary | ICD-10-CM

## 2024-10-14 NOTE — TELEPHONE ENCOUNTER
New patient chart prepared for Dr. Keke Duval to review, NP appt requested, images requested, outside path review sent

## 2024-10-15 LAB
LAB AP CASE REPORT: NORMAL
LAB AP DIAGNOSIS COMMENT: NORMAL
LAB AP IHC HER2/NEU REPORT,ADDENDUM: NORMAL
Lab: NORMAL
PATH REPORT.ADDENDUM SPEC: NORMAL
PATH REPORT.FINAL DX SPEC: NORMAL
PATH REPORT.GROSS SPEC: NORMAL

## 2024-10-24 PROBLEM — C50.919 INFILTRATING DUCTAL CARCINOMA OF BREAST: Status: ACTIVE | Noted: 2024-10-17

## 2024-10-24 PROBLEM — D18.03 HEMANGIOMA OF LIVER: Status: ACTIVE | Noted: 2019-03-04

## 2024-10-28 ENCOUNTER — ANCILLARY ORDERS (OUTPATIENT)
Dept: GENERAL RADIOLOGY | Facility: HOSPITAL | Age: 54
End: 2024-10-28
Payer: COMMERCIAL

## 2024-10-30 ENCOUNTER — HOSPITAL ENCOUNTER (OUTPATIENT)
Dept: MRI IMAGING | Facility: HOSPITAL | Age: 54
Discharge: HOME | End: 2024-10-30
Admitting: SURGERY
Payer: COMMERCIAL

## 2024-10-30 DIAGNOSIS — C50.411 MALIGNANT NEOPLASM OF UPPER-OUTER QUADRANT OF RIGHT FEMALE BREAST, UNSPECIFIED ESTROGEN RECEPTOR STATUS: ICD-10-CM

## 2024-10-30 LAB
LAB AP CASE REPORT: NORMAL
LAB AP DIAGNOSIS COMMENT: NORMAL
LAB AP IHC HER2/NEU REPORT,ADDENDUM: NORMAL
Lab: NORMAL
Lab: NORMAL
PATH REPORT.ADDENDUM SPEC: NORMAL
PATH REPORT.FINAL DX SPEC: NORMAL
PATH REPORT.GROSS SPEC: NORMAL
REF LAB TEST METHOD: NORMAL

## 2024-10-30 PROCEDURE — 25010000002 GADOTERIDOL PER 1 ML: Performed by: SURGERY

## 2024-10-30 PROCEDURE — 77049 MRI BREAST C-+ W/CAD BI: CPT

## 2024-10-30 PROCEDURE — A9579 GAD-BASE MR CONTRAST NOS,1ML: HCPCS | Performed by: SURGERY

## 2024-10-30 RX ADMIN — GADOTERIDOL 20 ML: 279.3 INJECTION, SOLUTION INTRAVENOUS at 11:04

## 2024-10-31 ENCOUNTER — PATIENT OUTREACH (OUTPATIENT)
Dept: ONCOLOGY | Facility: HOSPITAL | Age: 54
End: 2024-10-31
Payer: COMMERCIAL

## 2024-10-31 DIAGNOSIS — D05.11 DUCTAL CARCINOMA IN SITU (DCIS) OF RIGHT BREAST: Primary | ICD-10-CM

## 2024-11-04 ENCOUNTER — OFFICE VISIT (OUTPATIENT)
Dept: SURGERY | Facility: CLINIC | Age: 54
End: 2024-11-04
Payer: COMMERCIAL

## 2024-11-04 ENCOUNTER — TELEPHONE (OUTPATIENT)
Dept: SURGERY | Facility: CLINIC | Age: 54
End: 2024-11-04
Payer: COMMERCIAL

## 2024-11-04 VITALS
SYSTOLIC BLOOD PRESSURE: 124 MMHG | BODY MASS INDEX: 24.59 KG/M2 | HEART RATE: 106 BPM | OXYGEN SATURATION: 98 % | HEIGHT: 66 IN | WEIGHT: 153 LBS | DIASTOLIC BLOOD PRESSURE: 62 MMHG

## 2024-11-04 DIAGNOSIS — F40.298 FEAR OF NEEDLES: ICD-10-CM

## 2024-11-04 DIAGNOSIS — N64.9 ABNORMAL NIPPLE: ICD-10-CM

## 2024-11-04 DIAGNOSIS — C50.111 CANCER OF CENTRAL PORTION OF RIGHT BREAST: Primary | ICD-10-CM

## 2024-11-04 PROCEDURE — 99417 PROLNG OP E/M EACH 15 MIN: CPT | Performed by: SURGERY

## 2024-11-04 PROCEDURE — 99205 OFFICE O/P NEW HI 60 MIN: CPT | Performed by: SURGERY

## 2024-11-04 NOTE — PROGRESS NOTES
Chief Complaint: Sharon Ruth is a 54 y.o. female who was seen in consultation at the request of Angela Santillan MD  for newly diagnosed breast cancer    History of Present Illness:  Patient presents with breast mass, abnormal breast imaging, breast pain, and newly diagnosed breast cancer.    She noted some changes in nipple retraction several years ago but this was gradual so wasn't concerning to her.  She noted breast pain in the form of twinges about 6 months or so ago.  She and her  both then noted a mass in the upper breast at the time of more frequent discomfort.   She noted no other new masses, skin changes, nipple discharge, nipple changes prior to her most recent imaging.    Her most recent imaging includes the followin2024, MMG Diagnostic Bilateral w/Rohit, US Breast Right ([facility])  Scattered areas of fibroglandular density. Corresponding to the triangular marker denoting the area of palpable concern in the middle third 12:00 right breast, there is an irregular hyperdense spiculated mass measuring 1.6 cm x 1.9 cm x 2.2 cm. There is associated skin and nipple retraction.  Ultrasound: In the area of palpable concern  at 12;00, 4 cm from the nipple, there is a palpable mobile mass on clinical breast exam. Three is an associated underlying shadowing irregular indistinct hypoechoic mass measuring up to 2 cm x 1.7 cm x 2.2 cm on ultrasound.  BI-RADS 5      She had a biopsy on the following day that showed:   2024, ADDENDUM ([facility])  Pathology  12-gauge elevation  Total of 4 core biopsy specimens, coil shaped marker clip placed.    2024, ADDENDED REPORT  ER 90%  ID 95%  Breast, right, core biopsy   Invasive ductal carcinoma (tubule score 2, nuclear score 2, mitotic score 1, total 5/9)   Largest tumors focus 3 mm.   No carcinoma in situ or lymphovascular invasion identified.  [facility] HER2/jaime Report, ADDENDUM  HER2   Negative Score 0    10/02/2024, Breast  Predictive /Prognostic Marker Analysis  HER2 Negative  Score 0     I then arranged for a MRi:    10/30/2024, MRI Breast Bilateral   Right Breast:  Corresponding to biopsy-proven malignancy, there is a 2.9 x 1.9 x 2.4 x cm irregular mass with rim enhancement in the upper central right breast, middle depth. Signal void from biopsy clip is seen within the mass.  No axillary or internal mammary adenopathy is identified.  IMPRESSION:  No MRI signs of malignancy in the left breast.  BI-RADS 6      She has not had a breast biopsy in the past.  She has her uterus and ovaries, is postmenopausal, and takes nor hormones.  Her family history includes the following: Has 2 daughters, one sister, one aunt. Her MGGm had breast cancer uncertain age.    She is here for evaluation.    Review of Systems:  Review of Systems   All other systems reviewed and are negative.       Past Medical and Surgical History:  Breast Biopsy History:  Patient had not had a breast biopsy prior to her cancer diagnosis.  Breast Cancer HIstory:  Patient does not have a past medical history of breast cancer.  Breast Operations, and year:  NONE   Obstetric/Gynecologic History:  Age menstrual periods began: 13  Patient is postmenopausal, entered menopause naturally at age: 50   Number of pregnancies:5  Number of live births: 3  Number of abortions or miscarriages: 2  Age of delivery of first child: 21  Patient did not breast feed.  Length of time taking birth control pills: 13 YRS   Patient has never taken hormone replacement    PATIENT HAS BOTH OVARIES AND UTERUS.   Past Surgical History:   Procedure Laterality Date    BREAST BIOPSY Right 09/24/2024    BREAST CANCER     History reviewed. No pertinent past medical history.    Prior Hospitalizations, other than for surgery or childbirth, and year:  NONE     Social History     Socioeconomic History    Marital status:    Tobacco Use    Smoking status: Never     Passive exposure: Never    Smokeless tobacco:  "Never   Vaping Use    Vaping status: Never Used   Substance and Sexual Activity    Alcohol use: Yes     Alcohol/week: 1.0 standard drink of alcohol     Types: 1 Glasses of wine per week     Comment: SOCIALLY    Drug use: Never    Sexual activity: Defer     Patient is .  SELF EMPLOYED   Patient drinks 1 servings of caffeine per day.    Family History:  Family History   Problem Relation Age of Onset    Cancer Other         PATERNAL GREAT AUNT    Breast cancer Paternal Great-Grandmother        Vital Signs:  /62 (BP Location: Right arm, Patient Position: Sitting, Cuff Size: Adult)   Pulse 106   Ht 168.9 cm (66.5\")   Wt 69.4 kg (153 lb)   SpO2 98%   BMI 24.33 kg/m²      Medications:    Current Outpatient Medications:     melatonin 5 MG tablet tablet, Take 1 tablet by mouth At Night As Needed., Disp: , Rfl:     NON FORMULARY, ESTROGEN METABOLISM SUPPLEMENT, Disp: , Rfl:      Allergies:  Allergies   Allergen Reactions    Levofloxacin Other (See Comments)     FACE TINGLE ING     Penicillins Hives and Swelling     FACIAL SWELLING    Sulfa Antibiotics Hives       Physical Examination:  /62 (BP Location: Right arm, Patient Position: Sitting, Cuff Size: Adult)   Pulse 106   Ht 168.9 cm (66.5\")   Wt 69.4 kg (153 lb)   SpO2 98%   BMI 24.33 kg/m²   General Appearance:  Patient is in no distress.  She is well kept and has an average build.   Psychiatric:  Patient with appropriate mood and affect. Alert and oriented to self, time, and place.    Breast, RIGHT:  medium sized, symmetric with the contralateral side in terms of size. There is a dominant mass visible superior to the nipple areolar complex 12:00, 2 CFN measuring 3 x 3 cm. . No skin changes but significant nipple retraction.  Breast skin is without erythema, edema, rashes.  There are no other visible abnormalities upon inspection during the arm-raising maneuver or with hands on hips in the sitting position.     Breast, LEFT:  medium " sized, symmetric with the contralateral side.  Breast skin is without erythema, edema, rashes.  There are no visible abnormalities upon inspection during the arm-raising maneuver or with hands on hips in the sitting position. There is no nipple retraction, discharge or nipple/areolar skin changes.There are no masses palpable in the sitting or supine positions.    Lymphatic:  There is no axillary, cervical, infraclavicular, or supraclavicular adenopathy bilaterally.  Eyes:  Pupils are round and reactive to light.  Cardiovascular:  Heart rate and rhythm are regular.  Respiratory:  Lungs are clear bilaterally with no crackles or wheezes in any lung field.  Gastrointestinal:  Abdomen is soft, nondistended, and nontender.     Musculoskeletal:  Good strength in all 4 extremities.   There is good range of motion in both shoulders.    Skin:  No new skin lesions or rashes on the skin excluding the breast (see breast exam above).        Imagin2024, MMG Diagnostic Bilateral w/Rohit, US Breast Right ([facility])  Scattered areas of fibroglandular density. Corresponding to the triangular marker denoting the area of palpable concern in the middle third 12:00 right breast, there is an irregular hyperdense spiculated mass measuring 1.6 cm x 1.9 cm x 2.2 cm. There is associated skin and nipple retraction.  Ultrasound: In the area of palpable concern  at 12;00, 4 cm from the nipple, there is a palpable mobile mass on clinical breast exam. Three is an associated underlying shadowing irregular indistinct hypoechoic mass measuring up to 2 cm x 1.7 cm x 2.2 cm on ultrasound.  BI-RADS 5    10/30/2024, MRI Breast Bilateral   Right Breast:  Corresponding to biopsy-proven malignancy, there is a 2.9 x 1.9 x 2.4 x cm irregular mass with rim enhancement in the upper central right breast, middle depth. Signal void from biopsy clip is seen within the mass.  No axillary or internal mammary adenopathy is  identified.  IMPRESSION:  No MRI signs of malignancy in the left breast.  BI-RADS 6    Pathology:  09/24/2024, ADDENDUM ([facility])  Pathology  12-gauge elevation  Total of 4 core biopsy specimens, coil shaped marker clip placed.    09/24/2024, ADDENDED REPORT  ER 90%  DC 95%  Breast, right, core biopsy   Invasive ductal carcinoma (tubule score 2, nuclear score 2, mitotic score 1, total 5/9)   Largest tumors focus 3 mm.   No carcinoma in situ or lymphovascular invasion identified.  [facility] HER2/jaime Report, ADDENDUM  HER2   Negative Score 0    10/02/2024, Breast Predictive /Prognostic Marker Analysis  HER2 Negative  Score 0    Procedures:      Assessment:   Diagnosis Plan   1. Cancer of central portion of right breast  US Breast Left Limited    Mammo Diagnostic Digital Tomosynthesis Left With CAD      2. Abnormal nipple        3. Fear of needles          1-2  RIGHT breast 12:00, 2 CFN-   Symptoms of pain for 6 months, nipple retraction changes for a few years  2.4 cm on MRI, 2.2cm on mammogram, 2.2cm on ultrasound  Core 9-2024- [facility], NST, low grade, 2,2,1, 3mm in core, no DCIS, no LVI,   ER [id number] her 2 jaime 0  Clinical stage T2N0- IIA    - path review pending  - motivated for breast conservation    Plan:  The patient goes by Sharon and she is here with her  Leroy.    We reviewed her history, exam, imaging, pathology, and treatment options together today. We reviewed the differences in systemic therapy versus locoregional therapy.   We discussed the options of neoadjuvant versus adjuvant therapy.     In her case, she is very motivated for breast conservation and has a tumor that would not likely be responsive no neoadjuvant chemotherapy.  I spoke with Dr Alexander who plans to send an oncotype to be sure that it is a low score.    I will arrange for a CTCAP and bone scan due to the duration of her symptoms and the size.    I will send genetics on her due to the paucity of women in her family and her young  age.    She is aware that this neoadjuvant treatment course is slower than that of chemotherapy and may take years to get the desired volume reduction.    Will arrange for her to see plastics as she would likely benefit from a crescent mastopexy bilaterally at the time of her lumpectomy, irrespective of the size of the lump volume.  She knows that there is still a small chance of needing to lose her nipple  even after neoadjuvant endocrine and that if the tumor does not shrink well at all, the need for mastectomy including nipple removal.  .    I will arrange for a RIGHT DX mammogram and ultrasound at [facility] in 6 months and see her back after.  Her next bilateral mammogram is due 9-19-25.    Will obtain MRi prior to definitive surgical planning.  Note path review still outstanding    Keke Duval MD    Today I spent 95 minutes doing the following: Reviewing records, labs, outside imaging and reports in preparation for the patient visit; obtaining medical history; performing the physical exam; counseling and educating the patient and any available family or caregivers; ordering medications, tests or procedures; coordinating care with any other physicians on her care team as needed, and documenting all of the above in the medical record as well as sending communications with her other healthcare professionals.      Next Appointment:  Return for Next scheduled follow up, after imaging.      EMR Dragon/transcription disclaimer:    Please note that portions of this note were completed with a voice recognition program.

## 2024-11-08 ENCOUNTER — PATIENT ROUNDING (BHMG ONLY) (OUTPATIENT)
Dept: SURGERY | Facility: CLINIC | Age: 54
End: 2024-11-08
Payer: COMMERCIAL

## 2024-11-08 NOTE — PROGRESS NOTES
November 8, 2024    Hello, may I speak with Sharon Ruth?    My name is Madeline      I am  with [facility]  [address] [address] [phone].    Before we get started may I verify your date of birth? 1970    I am calling to officially welcome you to our practice and ask about your recent visit. Is this a good time to talk? No Left voicemail to call back    Tell me about your visit with us. What things went well?         We're always looking for ways to make our patients' experiences even better. Do you have recommendations on ways we may improve?      Overall were you satisfied with your first visit to our practice?        I appreciate you taking the time to speak with me today. Is there anything else I can do for you?       Thank you, and have a great day.

## 2024-11-11 ENCOUNTER — TELEPHONE (OUTPATIENT)
Dept: SURGERY | Facility: CLINIC | Age: 54
End: 2024-11-11
Payer: COMMERCIAL

## 2024-11-11 NOTE — TELEPHONE ENCOUNTER
[facility] as negative for mutation  We will let her know and also look for the oncotype sent by Dr Alexander

## 2024-11-11 NOTE — TELEPHONE ENCOUNTER
I called to let patient know that her genetics testing was normal. I had to leave her a message to call me back.     We would like to know if oncotype result is sent by Dr Alexander office is available.

## 2024-11-14 LAB
LAB AP CASE REPORT: NORMAL
LAB AP DIAGNOSIS COMMENT: NORMAL
LAB AP IHC HER2/NEU REPORT,ADDENDUM: NORMAL
Lab: NORMAL
PATH REPORT.ADDENDUM SPEC: NORMAL
PATH REPORT.FINAL DX SPEC: NORMAL
PATH REPORT.GROSS SPEC: NORMAL
REF LAB TEST METHOD: NORMAL

## 2024-11-18 DIAGNOSIS — C50.111 MALIGNANT NEOPLASM OF CENTRAL PORTION OF RIGHT FEMALE BREAST, UNSPECIFIED ESTROGEN RECEPTOR STATUS: Primary | ICD-10-CM

## 2024-11-20 LAB — REF LAB TEST METHOD: NORMAL

## 2025-01-02 ENCOUNTER — PATIENT OUTREACH (OUTPATIENT)
Dept: OTHER | Facility: HOSPITAL | Age: 55
End: 2025-01-02
Payer: COMMERCIAL

## 2025-01-02 NOTE — PROGRESS NOTES
The patient left me a message on 11/27/2024. She has decided to continue her care with another surgeon. Will close case for nurse navigation.